# Patient Record
Sex: FEMALE | URBAN - METROPOLITAN AREA
[De-identification: names, ages, dates, MRNs, and addresses within clinical notes are randomized per-mention and may not be internally consistent; named-entity substitution may affect disease eponyms.]

---

## 2023-07-13 ENCOUNTER — PROCEDURE (OUTPATIENT)
Facility: LOCATION | Age: 68
End: 2023-07-13
Payer: MEDICARE

## 2023-07-13 DIAGNOSIS — H16.223 KERATOCONJUNCT SICCA, NOT SPECIFIED AS SJOGREN'S, BILATERAL: ICD-10-CM

## 2023-07-13 DIAGNOSIS — H04.123 DRY EYE SYNDROME OF BILATERAL LACRIMAL GLANDS: Primary | ICD-10-CM

## 2023-07-13 ASSESSMENT — INTRAOCULAR PRESSURE
OD: 10
OS: 10

## 2023-07-13 NOTE — IMPRESSION/PLAN
Impression: Examination revealed dry eye syndrome secondary to tear deficiencies. EXT x4 punctal plugs placed today. Patient was consented and tolerated the procedure well and without complications. Exam shows EXT plugs x4 in place. Discussed R/B/A's on IPL with patient. Patient is understanding and would like to proceed. Plan: Patient to continue using current treatment: AT's QID, lid scrubs QHS, hot compresses QHS and lubricating tiffanie QHS OU. RTC in 4 months for EXT plugs x4. [employer] sent). RTC in 1 week for IPL #1 with Dr. Potter.

## 2023-07-26 ENCOUNTER — OFFICE VISIT (OUTPATIENT)
Facility: LOCATION | Age: 68
End: 2023-07-26
Payer: MEDICARE

## 2023-07-26 DIAGNOSIS — H04.123 DRY EYE SYNDROME OF BILATERAL LACRIMAL GLANDS: Primary | ICD-10-CM

## 2023-07-26 DIAGNOSIS — H16.223 KERATOCONJUNCT SICCA, NOT SPECIFIED AS SJOGREN'S, BILATERAL: ICD-10-CM

## 2023-07-26 PROCEDURE — 99214 OFFICE O/P EST MOD 30 MIN: CPT | Performed by: OPHTHALMOLOGY

## 2023-07-26 ASSESSMENT — INTRAOCULAR PRESSURE
OS: 16
OD: 16

## 2023-12-07 ENCOUNTER — OFFICE VISIT (OUTPATIENT)
Facility: LOCATION | Age: 68
End: 2023-12-07
Payer: MEDICARE

## 2023-12-07 DIAGNOSIS — H04.123 DRY EYE SYNDROME OF BILATERAL LACRIMAL GLANDS: Primary | ICD-10-CM

## 2023-12-07 DIAGNOSIS — H16.223 KERATOCONJUNCT SICCA, NOT SPECIFIED AS SJOGREN'S, BILATERAL: ICD-10-CM

## 2023-12-07 ASSESSMENT — INTRAOCULAR PRESSURE
OS: 17
OD: 18